# Patient Record
Sex: FEMALE | Race: WHITE | NOT HISPANIC OR LATINO | Employment: FULL TIME | URBAN - METROPOLITAN AREA
[De-identification: names, ages, dates, MRNs, and addresses within clinical notes are randomized per-mention and may not be internally consistent; named-entity substitution may affect disease eponyms.]

---

## 2017-06-20 PROBLEM — Z34.90 TERM PREGNANCY: Status: ACTIVE | Noted: 2017-06-20

## 2017-06-29 PROBLEM — Z34.90 TERM PREGNANCY: Status: RESOLVED | Noted: 2017-06-20 | Resolved: 2017-06-29

## 2019-05-09 PROBLEM — E06.3 HASHIMOTO'S THYROIDITIS: Status: ACTIVE | Noted: 2019-05-09

## 2019-05-09 PROBLEM — E66.3 OVERWEIGHT (BMI 25.0-29.9): Status: ACTIVE | Noted: 2019-05-09

## 2019-05-09 PROBLEM — E28.2 PCOS (POLYCYSTIC OVARIAN SYNDROME): Status: ACTIVE | Noted: 2019-05-09

## 2021-05-06 ENCOUNTER — PATIENT MESSAGE (OUTPATIENT)
Dept: RESEARCH | Facility: HOSPITAL | Age: 26
End: 2021-05-06

## 2021-11-02 DIAGNOSIS — Z36.89 ENCOUNTER FOR FETAL ANATOMIC SURVEY: Primary | ICD-10-CM

## 2021-11-03 ENCOUNTER — PATIENT MESSAGE (OUTPATIENT)
Dept: MATERNAL FETAL MEDICINE | Facility: CLINIC | Age: 26
End: 2021-11-03
Payer: COMMERCIAL

## 2021-11-04 ENCOUNTER — PROCEDURE VISIT (OUTPATIENT)
Dept: MATERNAL FETAL MEDICINE | Facility: CLINIC | Age: 26
End: 2021-11-04
Payer: COMMERCIAL

## 2021-11-04 ENCOUNTER — OFFICE VISIT (OUTPATIENT)
Dept: MATERNAL FETAL MEDICINE | Facility: CLINIC | Age: 26
End: 2021-11-04
Payer: COMMERCIAL

## 2021-11-04 VITALS
BODY MASS INDEX: 30.47 KG/M2 | HEIGHT: 66 IN | WEIGHT: 189.63 LBS | DIASTOLIC BLOOD PRESSURE: 78 MMHG | SYSTOLIC BLOOD PRESSURE: 118 MMHG

## 2021-11-04 DIAGNOSIS — O28.9 POSITIVE ANTENATAL SCREENING TEST: ICD-10-CM

## 2021-11-04 DIAGNOSIS — O28.5 POSITIVE RESULT ON MATERNAL SERUM SCREEN FOR TRISOMY 21: ICD-10-CM

## 2021-11-04 DIAGNOSIS — E06.3 HASHIMOTO'S THYROIDITIS: ICD-10-CM

## 2021-11-04 DIAGNOSIS — Z36.89 ENCOUNTER FOR FETAL ANATOMIC SURVEY: Primary | ICD-10-CM

## 2021-11-04 DIAGNOSIS — Z36.89 ENCOUNTER FOR FETAL ANATOMIC SURVEY: ICD-10-CM

## 2021-11-04 PROCEDURE — 76801 OB US < 14 WKS SINGLE FETUS: CPT | Mod: PBBFAC | Performed by: OBSTETRICS & GYNECOLOGY

## 2021-11-04 PROCEDURE — 3078F PR MOST RECENT DIASTOLIC BLOOD PRESSURE < 80 MM HG: ICD-10-PCS | Mod: CPTII,S$GLB,, | Performed by: OBSTETRICS & GYNECOLOGY

## 2021-11-04 PROCEDURE — 3008F BODY MASS INDEX DOCD: CPT | Mod: CPTII,S$GLB,, | Performed by: OBSTETRICS & GYNECOLOGY

## 2021-11-04 PROCEDURE — 3074F SYST BP LT 130 MM HG: CPT | Mod: CPTII,S$GLB,, | Performed by: OBSTETRICS & GYNECOLOGY

## 2021-11-04 PROCEDURE — 1159F MED LIST DOCD IN RCRD: CPT | Mod: CPTII,S$GLB,, | Performed by: OBSTETRICS & GYNECOLOGY

## 2021-11-04 PROCEDURE — 1159F PR MEDICATION LIST DOCUMENTED IN MEDICAL RECORD: ICD-10-PCS | Mod: CPTII,S$GLB,, | Performed by: OBSTETRICS & GYNECOLOGY

## 2021-11-04 PROCEDURE — 3074F PR MOST RECENT SYSTOLIC BLOOD PRESSURE < 130 MM HG: ICD-10-PCS | Mod: CPTII,S$GLB,, | Performed by: OBSTETRICS & GYNECOLOGY

## 2021-11-04 PROCEDURE — 99999 PR PBB SHADOW E&M-EST. PATIENT-LVL III: CPT | Mod: PBBFAC,,, | Performed by: OBSTETRICS & GYNECOLOGY

## 2021-11-04 PROCEDURE — 3008F PR BODY MASS INDEX (BMI) DOCUMENTED: ICD-10-PCS | Mod: CPTII,S$GLB,, | Performed by: OBSTETRICS & GYNECOLOGY

## 2021-11-04 PROCEDURE — 3078F DIAST BP <80 MM HG: CPT | Mod: CPTII,S$GLB,, | Performed by: OBSTETRICS & GYNECOLOGY

## 2021-11-04 PROCEDURE — 99205 PR OFFICE/OUTPT VISIT, NEW, LEVL V, 60-74 MIN: ICD-10-PCS | Mod: 25,S$GLB,, | Performed by: OBSTETRICS & GYNECOLOGY

## 2021-11-04 PROCEDURE — 99999 PR PBB SHADOW E&M-EST. PATIENT-LVL III: ICD-10-PCS | Mod: PBBFAC,,, | Performed by: OBSTETRICS & GYNECOLOGY

## 2021-11-04 PROCEDURE — 99205 OFFICE O/P NEW HI 60 MIN: CPT | Mod: 25,S$GLB,, | Performed by: OBSTETRICS & GYNECOLOGY

## 2021-11-04 RX ORDER — NAPROXEN SODIUM 220 MG/1
81 TABLET, FILM COATED ORAL DAILY
Refills: 0 | COMMUNITY
Start: 2021-11-04 | End: 2021-12-16

## 2021-11-04 RX ORDER — BUSPIRONE HYDROCHLORIDE 15 MG/1
7.5 TABLET ORAL 2 TIMES DAILY PRN
COMMUNITY
Start: 2021-10-27

## 2021-11-05 ENCOUNTER — TELEPHONE (OUTPATIENT)
Dept: GYNECOLOGIC ONCOLOGY | Facility: CLINIC | Age: 26
End: 2021-11-05
Payer: COMMERCIAL

## 2021-12-15 ENCOUNTER — PATIENT MESSAGE (OUTPATIENT)
Dept: MATERNAL FETAL MEDICINE | Facility: CLINIC | Age: 26
End: 2021-12-15
Payer: COMMERCIAL

## 2021-12-16 ENCOUNTER — PROCEDURE VISIT (OUTPATIENT)
Dept: MATERNAL FETAL MEDICINE | Facility: CLINIC | Age: 26
End: 2021-12-16
Payer: COMMERCIAL

## 2021-12-16 ENCOUNTER — TELEPHONE (OUTPATIENT)
Dept: MATERNAL FETAL MEDICINE | Facility: CLINIC | Age: 26
End: 2021-12-16

## 2021-12-16 ENCOUNTER — OFFICE VISIT (OUTPATIENT)
Dept: MATERNAL FETAL MEDICINE | Facility: CLINIC | Age: 26
End: 2021-12-16
Payer: COMMERCIAL

## 2021-12-16 VITALS
SYSTOLIC BLOOD PRESSURE: 118 MMHG | BODY MASS INDEX: 31.15 KG/M2 | HEIGHT: 66 IN | WEIGHT: 193.81 LBS | DIASTOLIC BLOOD PRESSURE: 62 MMHG

## 2021-12-16 DIAGNOSIS — Z91.89 AT INCREASED RISK OF EXPOSURE TO COVID-19 VIRUS: ICD-10-CM

## 2021-12-16 DIAGNOSIS — O35.9XX0 SUSPECTED FETAL ANOMALY, ANTEPARTUM, NOT APPLICABLE OR UNSPECIFIED FETUS: ICD-10-CM

## 2021-12-16 DIAGNOSIS — Z36.89 ENCOUNTER FOR ULTRASOUND TO CHECK FETAL GROWTH: Primary | ICD-10-CM

## 2021-12-16 DIAGNOSIS — O28.5 POSITIVE RESULT ON MATERNAL SERUM SCREEN FOR TRISOMY 21: Primary | ICD-10-CM

## 2021-12-16 DIAGNOSIS — Z36.89 ENCOUNTER FOR FETAL ANATOMIC SURVEY: ICD-10-CM

## 2021-12-16 DIAGNOSIS — O28.5 POSITIVE RESULT ON MATERNAL SERUM SCREEN FOR TRISOMY 21: ICD-10-CM

## 2021-12-16 DIAGNOSIS — O28.9 POSITIVE ANTENATAL SCREENING TEST: ICD-10-CM

## 2021-12-16 DIAGNOSIS — O35.9XX0 SUSPECTED FETAL ANOMALY, ANTEPARTUM, SINGLE OR UNSPECIFIED FETUS: ICD-10-CM

## 2021-12-16 PROCEDURE — 99215 OFFICE O/P EST HI 40 MIN: CPT | Mod: 25,S$GLB,, | Performed by: OBSTETRICS & GYNECOLOGY

## 2021-12-16 PROCEDURE — 99999 PR PBB SHADOW E&M-EST. PATIENT-LVL II: CPT | Mod: PBBFAC,,, | Performed by: OBSTETRICS & GYNECOLOGY

## 2021-12-16 PROCEDURE — 76811 OB US DETAILED SNGL FETUS: CPT | Mod: S$GLB,,, | Performed by: OBSTETRICS & GYNECOLOGY

## 2021-12-16 PROCEDURE — 99999 PR PBB SHADOW E&M-EST. PATIENT-LVL II: ICD-10-PCS | Mod: PBBFAC,,, | Performed by: OBSTETRICS & GYNECOLOGY

## 2021-12-16 PROCEDURE — 76811 US MFM PROCEDURE (VIEWPOINT): ICD-10-PCS | Mod: S$GLB,,, | Performed by: OBSTETRICS & GYNECOLOGY

## 2021-12-16 PROCEDURE — 99215 PR OFFICE/OUTPT VISIT, EST, LEVL V, 40-54 MIN: ICD-10-PCS | Mod: 25,S$GLB,, | Performed by: OBSTETRICS & GYNECOLOGY

## 2021-12-16 RX ORDER — ONDANSETRON HYDROCHLORIDE 8 MG/1
8 TABLET, FILM COATED ORAL EVERY 8 HOURS
Status: ON HOLD | COMMUNITY
Start: 2021-11-26 | End: 2022-04-20 | Stop reason: HOSPADM

## 2022-01-02 ENCOUNTER — PATIENT MESSAGE (OUTPATIENT)
Dept: ADMINISTRATIVE | Facility: OTHER | Age: 27
End: 2022-01-02
Payer: COMMERCIAL

## 2022-01-02 ENCOUNTER — LAB VISIT (OUTPATIENT)
Dept: PRIMARY CARE CLINIC | Facility: OTHER | Age: 27
End: 2022-01-02
Attending: INTERNAL MEDICINE
Payer: COMMERCIAL

## 2022-01-02 DIAGNOSIS — Z20.822 ENCOUNTER FOR LABORATORY TESTING FOR COVID-19 VIRUS: Primary | ICD-10-CM

## 2022-01-02 PROCEDURE — U0003 INFECTIOUS AGENT DETECTION BY NUCLEIC ACID (DNA OR RNA); SEVERE ACUTE RESPIRATORY SYNDROME CORONAVIRUS 2 (SARS-COV-2) (CORONAVIRUS DISEASE [COVID-19]), AMPLIFIED PROBE TECHNIQUE, MAKING USE OF HIGH THROUGHPUT TECHNOLOGIES AS DESCRIBED BY CMS-2020-01-R: HCPCS | Mod: ST72 | Performed by: INTERNAL MEDICINE

## 2022-01-06 DIAGNOSIS — U07.1 COVID-19 VIRUS DETECTED: ICD-10-CM

## 2022-01-06 LAB
SARS-COV-2 RNA RESP QL NAA+PROBE: DETECTED
SARS-COV-2- CYCLE NUMBER: 26

## 2022-01-13 ENCOUNTER — PATIENT MESSAGE (OUTPATIENT)
Dept: MATERNAL FETAL MEDICINE | Facility: CLINIC | Age: 27
End: 2022-01-13
Payer: COMMERCIAL

## 2022-01-14 ENCOUNTER — OFFICE VISIT (OUTPATIENT)
Dept: MATERNAL FETAL MEDICINE | Facility: CLINIC | Age: 27
End: 2022-01-14
Payer: COMMERCIAL

## 2022-01-14 ENCOUNTER — OFFICE VISIT (OUTPATIENT)
Dept: PEDIATRIC CARDIOLOGY | Facility: CLINIC | Age: 27
End: 2022-01-14
Attending: PEDIATRICS
Payer: COMMERCIAL

## 2022-01-14 ENCOUNTER — PROCEDURE VISIT (OUTPATIENT)
Dept: MATERNAL FETAL MEDICINE | Facility: CLINIC | Age: 27
End: 2022-01-14
Payer: COMMERCIAL

## 2022-01-14 ENCOUNTER — CLINICAL SUPPORT (OUTPATIENT)
Dept: PEDIATRIC CARDIOLOGY | Facility: CLINIC | Age: 27
End: 2022-01-14
Payer: COMMERCIAL

## 2022-01-14 VITALS
HEIGHT: 66 IN | WEIGHT: 197.75 LBS | SYSTOLIC BLOOD PRESSURE: 126 MMHG | HEART RATE: 92 BPM | BODY MASS INDEX: 31.78 KG/M2 | DIASTOLIC BLOOD PRESSURE: 73 MMHG | OXYGEN SATURATION: 100 %

## 2022-01-14 VITALS
SYSTOLIC BLOOD PRESSURE: 123 MMHG | WEIGHT: 197.75 LBS | BODY MASS INDEX: 31.93 KG/M2 | DIASTOLIC BLOOD PRESSURE: 76 MMHG

## 2022-01-14 DIAGNOSIS — Z36.89 ENCOUNTER FOR ULTRASOUND TO ASSESS FETAL GROWTH: ICD-10-CM

## 2022-01-14 DIAGNOSIS — O35.9XX0 SUSPECTED FETAL ANOMALY, ANTEPARTUM, NOT APPLICABLE OR UNSPECIFIED FETUS: ICD-10-CM

## 2022-01-14 DIAGNOSIS — O28.5 POSITIVE RESULT ON MATERNAL SERUM SCREEN FOR TRISOMY 21: ICD-10-CM

## 2022-01-14 DIAGNOSIS — O35.9XX0 SUSPECTED FETAL ANOMALY, ANTEPARTUM, SINGLE OR UNSPECIFIED FETUS: ICD-10-CM

## 2022-01-14 DIAGNOSIS — Z91.89 AT INCREASED RISK OF EXPOSURE TO COVID-19 VIRUS: ICD-10-CM

## 2022-01-14 DIAGNOSIS — O28.5 POSITIVE RESULT ON MATERNAL SERUM SCREEN FOR TRISOMY 21: Primary | ICD-10-CM

## 2022-01-14 DIAGNOSIS — Z36.89 ENCOUNTER FOR ULTRASOUND TO CHECK FETAL GROWTH: ICD-10-CM

## 2022-01-14 DIAGNOSIS — O35.9XX0 SUSPECTED FETAL ANOMALY, ANTEPARTUM, NOT APPLICABLE OR UNSPECIFIED FETUS: Primary | ICD-10-CM

## 2022-01-14 PROCEDURE — 93325 PR DOPPLER COLOR FLOW VELOCITY MAP: ICD-10-PCS | Mod: S$GLB,,, | Performed by: PEDIATRICS

## 2022-01-14 PROCEDURE — 3008F PR BODY MASS INDEX (BMI) DOCUMENTED: ICD-10-PCS | Mod: CPTII,S$GLB,, | Performed by: OBSTETRICS & GYNECOLOGY

## 2022-01-14 PROCEDURE — 76825 PR  SO2 FETAL HEART: ICD-10-PCS | Mod: S$GLB,,, | Performed by: PEDIATRICS

## 2022-01-14 PROCEDURE — 3074F PR MOST RECENT SYSTOLIC BLOOD PRESSURE < 130 MM HG: ICD-10-PCS | Mod: CPTII,S$GLB,, | Performed by: OBSTETRICS & GYNECOLOGY

## 2022-01-14 PROCEDURE — 99999 PR PBB SHADOW E&M-EST. PATIENT-LVL II: ICD-10-PCS | Mod: PBBFAC,,, | Performed by: OBSTETRICS & GYNECOLOGY

## 2022-01-14 PROCEDURE — 93325 DOPPLER ECHO COLOR FLOW MAPG: CPT | Mod: S$GLB,,, | Performed by: PEDIATRICS

## 2022-01-14 PROCEDURE — 99999 PR PBB SHADOW E&M-EST. PATIENT-LVL III: CPT | Mod: PBBFAC,,, | Performed by: PEDIATRICS

## 2022-01-14 PROCEDURE — 3074F SYST BP LT 130 MM HG: CPT | Mod: CPTII,S$GLB,, | Performed by: OBSTETRICS & GYNECOLOGY

## 2022-01-14 PROCEDURE — 76816 OB US FOLLOW-UP PER FETUS: CPT | Mod: S$GLB,,, | Performed by: OBSTETRICS & GYNECOLOGY

## 2022-01-14 PROCEDURE — 76816 US MFM PROCEDURE (VIEWPOINT): ICD-10-PCS | Mod: S$GLB,,, | Performed by: OBSTETRICS & GYNECOLOGY

## 2022-01-14 PROCEDURE — 99214 PR OFFICE/OUTPT VISIT, EST, LEVL IV, 30-39 MIN: ICD-10-PCS | Mod: 25,S$GLB,, | Performed by: OBSTETRICS & GYNECOLOGY

## 2022-01-14 PROCEDURE — 1160F PR REVIEW ALL MEDS BY PRESCRIBER/CLIN PHARMACIST DOCUMENTED: ICD-10-PCS | Mod: CPTII,S$GLB,, | Performed by: OBSTETRICS & GYNECOLOGY

## 2022-01-14 PROCEDURE — 1159F PR MEDICATION LIST DOCUMENTED IN MEDICAL RECORD: ICD-10-PCS | Mod: CPTII,S$GLB,, | Performed by: OBSTETRICS & GYNECOLOGY

## 2022-01-14 PROCEDURE — 76827 ECHO EXAM OF FETAL HEART: CPT | Mod: S$GLB,,, | Performed by: PEDIATRICS

## 2022-01-14 PROCEDURE — 99999 PR PBB SHADOW E&M-EST. PATIENT-LVL III: ICD-10-PCS | Mod: PBBFAC,,, | Performed by: PEDIATRICS

## 2022-01-14 PROCEDURE — 99203 PR OFFICE/OUTPT VISIT, NEW, LEVL III, 30-44 MIN: ICD-10-PCS | Mod: 25,S$GLB,, | Performed by: PEDIATRICS

## 2022-01-14 PROCEDURE — 3078F PR MOST RECENT DIASTOLIC BLOOD PRESSURE < 80 MM HG: ICD-10-PCS | Mod: CPTII,S$GLB,, | Performed by: OBSTETRICS & GYNECOLOGY

## 2022-01-14 PROCEDURE — 1160F RVW MEDS BY RX/DR IN RCRD: CPT | Mod: CPTII,S$GLB,, | Performed by: OBSTETRICS & GYNECOLOGY

## 2022-01-14 PROCEDURE — 3008F BODY MASS INDEX DOCD: CPT | Mod: CPTII,S$GLB,, | Performed by: OBSTETRICS & GYNECOLOGY

## 2022-01-14 PROCEDURE — 76825 ECHO EXAM OF FETAL HEART: CPT | Mod: S$GLB,,, | Performed by: PEDIATRICS

## 2022-01-14 PROCEDURE — 1159F MED LIST DOCD IN RCRD: CPT | Mod: CPTII,S$GLB,, | Performed by: OBSTETRICS & GYNECOLOGY

## 2022-01-14 PROCEDURE — 99999 PR PBB SHADOW E&M-EST. PATIENT-LVL II: CPT | Mod: PBBFAC,,, | Performed by: OBSTETRICS & GYNECOLOGY

## 2022-01-14 PROCEDURE — 99214 OFFICE O/P EST MOD 30 MIN: CPT | Mod: 25,S$GLB,, | Performed by: OBSTETRICS & GYNECOLOGY

## 2022-01-14 PROCEDURE — 99203 OFFICE O/P NEW LOW 30 MIN: CPT | Mod: 25,S$GLB,, | Performed by: PEDIATRICS

## 2022-01-14 PROCEDURE — 76827 PR  SO2 FETAL HEART DOPPLER: ICD-10-PCS | Mod: S$GLB,,, | Performed by: PEDIATRICS

## 2022-01-14 PROCEDURE — 3078F DIAST BP <80 MM HG: CPT | Mod: CPTII,S$GLB,, | Performed by: OBSTETRICS & GYNECOLOGY

## 2022-01-14 NOTE — ASSESSMENT & PLAN NOTE
Suspected perimembranous VSD on targeted midtrimester anatomy scan     Pt reports normal echocardiogram today, final report pending.    Recommendations:  1. Routine care pending Dr. Hargrove's final recommendations

## 2022-01-14 NOTE — ASSESSMENT & PLAN NOTE
Cell free DNA positive for trisomy 21  Declines amniocentesis  Suspected thick NT  Hypoplastic nasal bone and possible VSD on targeted scan (normal fetal echocardiogram)     Doing well today. Follow up growth and anatomy today reassuring.    Recommendations:  1. Continue prenatal care with Dr. Beauchamp  2. Initiate serial monthly growth assessments at 28 weeks (ordered today)  3. Initiate weekly PNT at 32 weeks  4. Given normal fetal echo and otherwise unremarkable anatomy survey, no current indication for [place] anywhere other [place] [facility] [place]. Timing is 39 weeks outside of indication for earlier delivery.  5. No further MFM visits warranted unless subsequent complications or questions arise.

## 2022-01-14 NOTE — ASSESSMENT & PLAN NOTE
Declines vaccine     Both Jeanine and her partner had COVID after [place]. Thankfully she reports only one day of feeling ill and did not require subsequent interventions. COVID during pregnancy has been associated with preeclampsia, fetal growth restriction and stillbirth, though there may be a dose-dependent relationship between disease severity and adverse outcomes. We are not currently recommending increased  surveillance for mothers who had mild disease during pregnancy, though given her presumed diagnosis of fetal trisomy 21, she will already be undergoing more vigilant fetal monitoring.

## 2022-01-14 NOTE — PROGRESS NOTES
"Ms. Gonzalez  is a 26 y.o. year old  , referred by Dr. De Leon because of maternal serum screen positive for Down syndrome and suspected ventricular septal defect.    The patient presented at approximately 23 47/ weeks gestation.  The patient denied any complaints.    Past medical history: Significant for PCOS.  Past surgical history: Negative.  Past gestational history: The patient has a healthy daughter (5 y/o).    Family history: Negative for congenital heart disease, and sudden death during childhood.    Medications:   Outpatient Encounter Medications as of 2022   Medication Sig Dispense Refill    busPIRone (BUSPAR) 15 MG tablet Take 7.5 mg by mouth 2 (two) times daily as needed.      ondansetron (ZOFRAN) 8 MG tablet Take 8 mg by mouth every 8 (eight) hours.       No facility-administered encounter medications on file as of 2022.       Allergies: Patient has no known allergies.    Blood pressure 126/73, pulse 92, height 5' 5.98" (1.676 m), weight 89.7 kg (197 lb 12 oz), SpO2 100 %, unknown if currently breastfeeding.    Fetal echocardiogram revealed a four chamber fetal heart with situs solitus.  The ventricles appeared to be equal in size.  The contractility of both ventricles was good.  The fetal heart rate was within the normal range, and regular.  The interventricular septum appeared to be intact.  There were normally related great arteries seen.  The ductal and aortic arch were well visualized, and appeared to be widely patent.  There was no pleural or pericardial effusion seen.    Doppler analysis revealed a three vessel umbilical cord, with normal flow patterns, and velocities by Doppler.  There was a normal flow pattern seen in the ductus venosus.  There was evidence of normal systemic, and pulmonary venous return seen.  There was a normal right to left shunt seen across the foramen ovale.  There were normal inflow patterns seen across the AV-valves, without significant insufficiency.  " There was no ventricular level shunt seen.  The right and left ventricular outflow tract, and ductal and aortic arch appeared to be unobstructed.    Impression:  It is our impression that Ms. Gonzalez had a normal fetal echocardiogram.  As you know, small defects, and coarctation of the aorta cannot always be ruled out on fetal echocardiogram.  We discussed our findings with the patient, reviewed our images, and answered her questions. We also discussed the limitations of fetal echocardiography, but emphasized that her child appears to have normal cardiac anatomy.  We also mentioned that approximately 50 % of children with Down syndrome do not have heart disease and a normal cardiac evaluation does not rule in or out DS.  No further follow up is scheduled in our clinic, but, of course, we will always be available to reevaluate this patient, if needed.    The above information was discussed in detail including the use of diagrams, with 30 minutes of total face to face time, with greater than 50% with counseling and coordination of care.  The discussion of the diagnosis and treatment options is as described above.      Time spent: 30 minutes, 50% dedicated to counseling.

## 2022-01-14 NOTE — PROGRESS NOTES
Maternal Fetal Medicine follow up consult    SUBJECTIVE:     Jeanine Gonzalez is a 26 y.o.  female with IUP at 23w4d who is seen in follow up consultation by MFM.  Pregnancy complications include:   Problem   Suspected Fetal Anomaly, Antepartum, Not Applicable Or Unspecified Fetus   At Increased Risk of Exposure to Covid-19 Virus   Positive Result On Maternal Serum Screen for Trisomy 21       Previous notes reviewed.   No changes to medical, surgical, family, social, or obstetric history.    Interval history since last MFM visit: Doing well today. Had COVID after [place] as did Jani. Reports only a day of symptoms, no intervention required. Saw Dr. Hargrove today, pt reports fetal echocardiogram was normal.    Medications:  Current Outpatient Medications   Medication Instructions    busPIRone (BUSPAR) 7.5 mg, Oral, 2 times daily PRN    ondansetron (ZOFRAN) 8 mg, Oral, Every 8 hours       Care team members:  Dr. Beauchamp - Primary OB     OBJECTIVE:   /76   Wt 89.7 kg (197 lb 12 oz)   BMI 31.93 kg/m²     Ultrasound performed. See viewpoint for full ultrasound report.  1. The fetal anatomic survey was completed today, and no fetal structural abnormalities were identified of the structures imaged today.   2. Fetal size is AGA with the EFW at the 16%.  3. AFV is normal.   4. Placental location is normal.     ASSESSMENT/PLAN:     26 y.o.  female with IUP at 23w4d    Positive result on maternal serum screen for trisomy 21  Cell free DNA positive for trisomy 21  Declines amniocentesis  Suspected thick NT  Hypoplastic nasal bone and possible VSD on targeted scan (normal fetal echocardiogram)     Doing well today. Follow up growth and anatomy today reassuring.    Recommendations:  1. Continue prenatal care with Dr. Beauchamp  2. Initiate serial monthly growth assessments at 28 weeks (ordered today)  3. Initiate weekly PNT at 32 weeks  4. Given normal fetal echo and otherwise unremarkable anatomy  survey, no current indication for [place] [facility] [place]. Timing is 39 weeks outside of indication for earlier delivery.  5. No further MFM visits warranted unless subsequent complications or questions arise.    Suspected fetal anomaly, antepartum, not applicable or unspecified fetus  Suspected perimembranous VSD on targeted midtrimester anatomy scan     Pt reports normal echocardiogram today, final report pending.    Recommendations:  1. Routine care pending Dr. Hargrove's final recommendations    At increased risk of exposure to COVID-19 virus  Declines vaccine     Both Jeanine and her partner had COVID after Mary. Thankfully she reports only one day of feeling ill and did not require subsequent interventions. COVID during pregnancy has been associated with preeclampsia, fetal growth restriction and stillbirth, though there may be a dose-dependent relationship between disease severity and adverse outcomes. We are not currently recommending increased  surveillance for mothers who had mild disease during pregnancy, though given her presumed diagnosis of fetal trisomy 21, she will already be undergoing more vigilant fetal monitoring.    F/u in 5 weeks for US    Thank you for the opportunity to participate in the care of Jeanine. Today I spent 30 minutes in the care of Ms. Gonzalez. This includes face to face time and non-face to face time preparing to see the patient (eg, review of tests), obtaining and/or reviewing separately obtained history, documenting clinical information in the electronic or other health record, independently interpreting results and communicating results to the patient/family/caregiver, or care coordination.       SANDY De Leon MD/MPH  Maternal Fetal Medicine

## 2022-04-06 DIAGNOSIS — O40.3XX0 POLYHYDRAMNIOS AFFECTING PREGNANCY IN THIRD TRIMESTER: Primary | ICD-10-CM

## 2022-04-18 ENCOUNTER — ANESTHESIA (OUTPATIENT)
Dept: OBSTETRICS AND GYNECOLOGY | Facility: OTHER | Age: 27
End: 2022-04-18
Payer: COMMERCIAL

## 2022-04-18 ENCOUNTER — HOSPITAL ENCOUNTER (INPATIENT)
Facility: OTHER | Age: 27
LOS: 3 days | Discharge: HOME | End: 2022-04-21
Attending: OBSTETRICS & GYNECOLOGY | Admitting: OBSTETRICS & GYNECOLOGY
Payer: COMMERCIAL

## 2022-04-18 ENCOUNTER — ANESTHESIA EVENT (OUTPATIENT)
Dept: OBSTETRICS AND GYNECOLOGY | Facility: OTHER | Age: 27
End: 2022-04-18
Payer: COMMERCIAL

## 2022-04-18 DIAGNOSIS — Z34.90 ENCOUNTER FOR INDUCTION OF LABOR: ICD-10-CM

## 2022-04-18 DIAGNOSIS — O36.8990 PERICARDIAL EFFUSION IN FETUS AFFECTING MANAGEMENT OF MOTHER: ICD-10-CM

## 2022-04-18 PROBLEM — O16.3 HYPERTENSION AFFECTING PREGNANCY IN THIRD TRIMESTER: Status: ACTIVE | Noted: 2022-04-18

## 2022-04-18 LAB
ABO + RH BLD: NORMAL
BASOPHILS # BLD AUTO: 0.01 K/UL (ref 0–0.2)
BASOPHILS NFR BLD: 0.1 % (ref 0–1.9)
BLD GP AB SCN CELLS X3 SERPL QL: NORMAL
DIFFERENTIAL METHOD: ABNORMAL
EOSINOPHIL # BLD AUTO: 0 K/UL (ref 0–0.5)
EOSINOPHIL NFR BLD: 0.1 % (ref 0–8)
ERYTHROCYTE [DISTWIDTH] IN BLOOD BY AUTOMATED COUNT: 12.8 % (ref 11.5–14.5)
HCT VFR BLD AUTO: 36.3 % (ref 37–48.5)
HGB BLD-MCNC: 12.2 G/DL (ref 12–16)
IMM GRANULOCYTES # BLD AUTO: 0.07 K/UL (ref 0–0.04)
IMM GRANULOCYTES NFR BLD AUTO: 0.9 % (ref 0–0.5)
LYMPHOCYTES # BLD AUTO: 1.5 K/UL (ref 1–4.8)
LYMPHOCYTES NFR BLD: 19.3 % (ref 18–48)
MCH RBC QN AUTO: 29.7 PG (ref 27–31)
MCHC RBC AUTO-ENTMCNC: 33.6 G/DL (ref 32–36)
MCV RBC AUTO: 88 FL (ref 82–98)
MONOCYTES # BLD AUTO: 0.5 K/UL (ref 0.3–1)
MONOCYTES NFR BLD: 6 % (ref 4–15)
NEUTROPHILS # BLD AUTO: 5.8 K/UL (ref 1.8–7.7)
NEUTROPHILS NFR BLD: 73.6 % (ref 38–73)
NRBC BLD-RTO: 0 /100 WBC
PLATELET # BLD AUTO: 247 K/UL (ref 150–450)
PMV BLD AUTO: 10.9 FL (ref 9.2–12.9)
RBC # BLD AUTO: 4.11 M/UL (ref 4–5.4)
WBC # BLD AUTO: 7.88 K/UL (ref 3.9–12.7)

## 2022-04-18 PROCEDURE — 62326 NJX INTERLAMINAR LMBR/SAC: CPT | Performed by: ANESTHESIOLOGY

## 2022-04-18 PROCEDURE — 99223 PR INITIAL HOSPITAL CARE,LEVL III: ICD-10-PCS | Mod: ,,, | Performed by: OBSTETRICS & GYNECOLOGY

## 2022-04-18 PROCEDURE — 99223 1ST HOSP IP/OBS HIGH 75: CPT | Mod: ,,, | Performed by: OBSTETRICS & GYNECOLOGY

## 2022-04-18 PROCEDURE — 25000003 PHARM REV CODE 250: Performed by: STUDENT IN AN ORGANIZED HEALTH CARE EDUCATION/TRAINING PROGRAM

## 2022-04-18 PROCEDURE — C1751 CATH, INF, PER/CENT/MIDLINE: HCPCS | Performed by: ANESTHESIOLOGY

## 2022-04-18 PROCEDURE — 27200710 HC EPIDURAL INFUSION PUMP SET: Performed by: ANESTHESIOLOGY

## 2022-04-18 PROCEDURE — 51702 INSERT TEMP BLADDER CATH: CPT

## 2022-04-18 PROCEDURE — 11000001 HC ACUTE MED/SURG PRIVATE ROOM

## 2022-04-18 PROCEDURE — 86901 BLOOD TYPING SEROLOGIC RH(D): CPT | Performed by: STUDENT IN AN ORGANIZED HEALTH CARE EDUCATION/TRAINING PROGRAM

## 2022-04-18 PROCEDURE — 59400 PRA FULL ROUT OBSTE CARE,VAGINAL DELIV: ICD-10-PCS | Mod: QY,,, | Performed by: ANESTHESIOLOGY

## 2022-04-18 PROCEDURE — 25000003 PHARM REV CODE 250

## 2022-04-18 PROCEDURE — 63600175 PHARM REV CODE 636 W HCPCS: Performed by: STUDENT IN AN ORGANIZED HEALTH CARE EDUCATION/TRAINING PROGRAM

## 2022-04-18 PROCEDURE — 85025 COMPLETE CBC W/AUTO DIFF WBC: CPT | Mod: 91 | Performed by: STUDENT IN AN ORGANIZED HEALTH CARE EDUCATION/TRAINING PROGRAM

## 2022-04-18 PROCEDURE — 25000003 PHARM REV CODE 250: Performed by: ANESTHESIOLOGY

## 2022-04-18 PROCEDURE — 59400 OBSTETRICAL CARE: CPT | Mod: QY,,, | Performed by: ANESTHESIOLOGY

## 2022-04-18 RX ORDER — SIMETHICONE 80 MG
1 TABLET,CHEWABLE ORAL 4 TIMES DAILY PRN
Status: DISCONTINUED | OUTPATIENT
Start: 2022-04-18 | End: 2022-04-19

## 2022-04-18 RX ORDER — OXYTOCIN/RINGER'S LACTATE 30/500 ML
0-30 PLASTIC BAG, INJECTION (ML) INTRAVENOUS CONTINUOUS
Status: DISCONTINUED | OUTPATIENT
Start: 2022-04-18 | End: 2022-04-19

## 2022-04-18 RX ORDER — ONDANSETRON 8 MG/1
8 TABLET, ORALLY DISINTEGRATING ORAL EVERY 8 HOURS PRN
Status: DISCONTINUED | OUTPATIENT
Start: 2022-04-18 | End: 2022-04-19

## 2022-04-18 RX ORDER — FENTANYL/BUPIVACAINE/NS/PF 2MCG/ML-.1
PLASTIC BAG, INJECTION (ML) INJECTION
Status: COMPLETED
Start: 2022-04-18 | End: 2022-04-19

## 2022-04-18 RX ORDER — CEFAZOLIN SODIUM 2 G/50ML
2 SOLUTION INTRAVENOUS ONCE AS NEEDED
Status: DISCONTINUED | OUTPATIENT
Start: 2022-04-18 | End: 2022-04-19

## 2022-04-18 RX ORDER — SODIUM CHLORIDE 9 MG/ML
125 INJECTION, SOLUTION INTRAVENOUS
Status: DISCONTINUED | OUTPATIENT
Start: 2022-04-18 | End: 2022-04-19

## 2022-04-18 RX ORDER — FENTANYL CITRATE 50 UG/ML
INJECTION, SOLUTION INTRAMUSCULAR; INTRAVENOUS
Status: COMPLETED
Start: 2022-04-18 | End: 2022-04-18

## 2022-04-18 RX ORDER — BUPIVACAINE HYDROCHLORIDE 2.5 MG/ML
INJECTION, SOLUTION EPIDURAL; INFILTRATION; INTRACAUDAL
Status: COMPLETED
Start: 2022-04-18 | End: 2022-04-18

## 2022-04-18 RX ORDER — OXYTOCIN/RINGER'S LACTATE 30/500 ML
95 PLASTIC BAG, INJECTION (ML) INTRAVENOUS CONTINUOUS
Status: DISCONTINUED | OUTPATIENT
Start: 2022-04-18 | End: 2022-04-18

## 2022-04-18 RX ORDER — FENTANYL CITRATE 50 UG/ML
INJECTION, SOLUTION INTRAMUSCULAR; INTRAVENOUS
Status: DISCONTINUED | OUTPATIENT
Start: 2022-04-18 | End: 2022-04-19

## 2022-04-18 RX ORDER — LIDOCAINE HYDROCHLORIDE AND EPINEPHRINE 15; 5 MG/ML; UG/ML
INJECTION, SOLUTION EPIDURAL
Status: DISCONTINUED | OUTPATIENT
Start: 2022-04-18 | End: 2022-04-19

## 2022-04-18 RX ORDER — CALCIUM CARBONATE 200(500)MG
500 TABLET,CHEWABLE ORAL 3 TIMES DAILY PRN
Status: DISCONTINUED | OUTPATIENT
Start: 2022-04-18 | End: 2022-04-19

## 2022-04-18 RX ORDER — FENTANYL/BUPIVACAINE/NS/PF 2MCG/ML-.1
PLASTIC BAG, INJECTION (ML) INJECTION CONTINUOUS PRN
Status: DISCONTINUED | OUTPATIENT
Start: 2022-04-18 | End: 2022-04-19

## 2022-04-18 RX ORDER — OXYTOCIN/RINGER'S LACTATE 30/500 ML
334 PLASTIC BAG, INJECTION (ML) INTRAVENOUS ONCE
Status: DISCONTINUED | OUTPATIENT
Start: 2022-04-18 | End: 2022-04-18

## 2022-04-18 RX ORDER — BUSPIRONE HYDROCHLORIDE 7.5 MG/1
7.5 TABLET ORAL 2 TIMES DAILY PRN
Status: DISCONTINUED | OUTPATIENT
Start: 2022-04-18 | End: 2022-04-21 | Stop reason: HOSPADM

## 2022-04-18 RX ORDER — TRANEXAMIC ACID 100 MG/ML
1000 INJECTION, SOLUTION INTRAVENOUS ONCE AS NEEDED
Status: DISCONTINUED | OUTPATIENT
Start: 2022-04-18 | End: 2022-04-19

## 2022-04-18 RX ORDER — SODIUM CHLORIDE, SODIUM LACTATE, POTASSIUM CHLORIDE, CALCIUM CHLORIDE 600; 310; 30; 20 MG/100ML; MG/100ML; MG/100ML; MG/100ML
INJECTION, SOLUTION INTRAVENOUS CONTINUOUS
Status: DISCONTINUED | OUTPATIENT
Start: 2022-04-18 | End: 2022-04-19

## 2022-04-18 RX ORDER — PROCHLORPERAZINE EDISYLATE 5 MG/ML
5 INJECTION INTRAMUSCULAR; INTRAVENOUS EVERY 6 HOURS PRN
Status: DISCONTINUED | OUTPATIENT
Start: 2022-04-18 | End: 2022-04-19

## 2022-04-18 RX ADMIN — SODIUM CHLORIDE, SODIUM LACTATE, POTASSIUM CHLORIDE, AND CALCIUM CHLORIDE: .6; .31; .03; .02 INJECTION, SOLUTION INTRAVENOUS at 05:04

## 2022-04-18 RX ADMIN — BUPIVACAINE HYDROCHLORIDE 4 ML: 2.5 INJECTION, SOLUTION EPIDURAL; INFILTRATION; INTRACAUDAL; PERINEURAL at 09:04

## 2022-04-18 RX ADMIN — BUPIVACAINE HYDROCHLORIDE: 2.5 INJECTION, SOLUTION EPIDURAL; INFILTRATION; INTRACAUDAL; PERINEURAL at 09:04

## 2022-04-18 RX ADMIN — Medication 10 ML/HR: at 09:04

## 2022-04-18 RX ADMIN — DEXTROSE 3 MILLION UNITS: 50 INJECTION, SOLUTION INTRAVENOUS at 09:04

## 2022-04-18 RX ADMIN — ONDANSETRON 8 MG: 8 TABLET, ORALLY DISINTEGRATING ORAL at 06:04

## 2022-04-18 RX ADMIN — DEXTROSE 5 MILLION UNITS: 50 INJECTION, SOLUTION INTRAVENOUS at 05:04

## 2022-04-18 RX ADMIN — FENTANYL CITRATE 100 MCG: 50 INJECTION, SOLUTION INTRAMUSCULAR; INTRAVENOUS at 09:04

## 2022-04-18 RX ADMIN — LIDOCAINE HYDROCHLORIDE,EPINEPHRINE BITARTRATE 3 ML: 15; .005 INJECTION, SOLUTION EPIDURAL; INFILTRATION; INTRACAUDAL; PERINEURAL at 09:04

## 2022-04-18 RX ADMIN — Medication 4 MILLI-UNITS/MIN: at 06:04

## 2022-04-18 NOTE — ANESTHESIA PREPROCEDURE EVALUATION
[facility]  Anesthesia Pre-Operative Evaluation         Patient Name: Jeanine Gonzalez  YOB: 1995  MRN: [id number]    2022      Jeanine Gonzalez is a 26 y.o. female  at 37w0d who presents as a transfer from [facility] for higher level of care due to multiple fetal anomalies. Current IUP has been complicated by likely fetal Trisomy 21, duodenal atresia, and newly discovered fetal pericardial effusion.     Previous pregnancy was uncomplicated and resulted in .    She reports previous neuraxial anesthesia - had an epidural with her previous delivery. She denies complications with this and requests an epidural for this delivery.    She denies history of HTN, asthma, bleeding or coagulation disorders, spine abnormalities, or previous back surgeries.      OB History    Para Term  AB Living   2 1 1     1   SAB IAB Ectopic Multiple Live Births         0 1      # Outcome Date GA Lbr Luis/2nd Weight Sex Delivery Anes PTL Lv   2 Current            1 Term 17 40w6d 02:47 / 01:38 3.825 kg (8 lb 6.9 oz) F Vag-Spont EPI N JAI       Review of patient's allergies indicates:  No Known Allergies    Wt Readings from Last 1 Encounters:   22 1151 98.4 kg (217 lb)       BP Readings from Last 3 Encounters:   22 137/78   22 124/64   02/15/22 122/74       Patient Active Problem List   Diagnosis    Hashimoto's thyroiditis    PCOS (polycystic ovarian syndrome)    Overweight (BMI 25.0-29.9)    Positive result on maternal serum screen for trisomy 21    Suspected fetal anomaly, antepartum, not applicable or unspecified fetus    At increased risk of exposure to COVID-19 virus    Hypertension affecting pregnancy in third trimester    Encounter for induction of labor       Past Surgical History:   Procedure Laterality Date    CHOLECYSTECTOMY      ROBOT-ASSISTED EXPLORATORY LAPAROSCOPY      checking for endometriosis    TONSILLECTOMY       with adenoidectomy       Tobacco Use: Medium Risk    Smoking Tobacco Use: Former Smoker    Smokeless Tobacco Use: Never Used     Alcohol Use: Not on file     Social History     Substance and Sexual Activity   Drug Use No         Chemistry        Component Value Date/Time     (L) 04/18/2022 1206    K 3.8 04/18/2022 1206     04/18/2022 1206    CO2 21 (L) 04/18/2022 1206    BUN 3 (L) 04/18/2022 1206    CREATININE 0.40 (L) 04/18/2022 1206     (H) 04/18/2022 1206        Component Value Date/Time    CALCIUM 9.0 04/18/2022 1206    ALKPHOS 101 04/18/2022 1206    AST 21 04/18/2022 1206    ALT 14 04/18/2022 1206    BILITOT 0.4 04/18/2022 1206    ESTGFRAFRICA >60 04/18/2022 1206    EGFRNONAA >60 04/18/2022 1206            Lab Results   Component Value Date    WBC 6.50 04/18/2022    HGB 10.2 (L) 04/18/2022    HCT 29.7 (L) 04/18/2022     04/18/2022       No results found for: LABPROT, INR, APTT      Pre-op Assessment    I have reviewed the Patient Summary Reports.     I have reviewed the Nursing Notes. I have reviewed the NPO Status.   I have reviewed the Medications.     Review of Systems  Anesthesia Hx:  No problems with previous Anesthesia Denies Hx of Anesthetic complications   Denies Personal Hx of Anesthesia complications.   Hematology/Oncology:         -- Coag Disorders: Denies Bleeding Disorder:   Cardiovascular:   Denies Hypertension.  Denies Valvular problems/Murmurs.  Denies Dysrhythmias.         Pulmonary:   Denies Asthma.  Denies Shortness of breath.    Renal/:   Denies Chronic Renal Disease.     Hepatic/GI:   Denies Liver Disease.    Neurological:   Denies CVA. Denies Seizures.    Endocrine:   Denies Diabetes.  Thyroid Disease Hypothyroidism    Psych:   Denies Psychiatric History.          Physical Exam  General: Well nourished, Cooperative and Alert    Airway:  Mallampati: I       Dental:  Intact        Anesthesia Plan  Type of Anesthesia, risks & benefits discussed:    Anesthesia  Type: Gen ETT, Epidural, Spinal, CSE  Intra-op Monitoring Plan: Standard ASA Monitors  Post Op Pain Control Plan: multimodal analgesia  Induction:  IV  Informed Consent: Informed consent signed with the Patient and all parties understand the risks and agree with anesthesia plan.  All questions answered.   ASA Score: 2  Day of Surgery Review of History & Physical: H&P Update referred to the surgeon/provider.    Ready For Surgery From Anesthesia Perspective.     .

## 2022-04-19 PROBLEM — Z34.90 ENCOUNTER FOR INDUCTION OF LABOR: Status: RESOLVED | Noted: 2022-04-18 | Resolved: 2022-04-19

## 2022-04-19 PROCEDURE — 63600175 PHARM REV CODE 636 W HCPCS: Performed by: ANESTHESIOLOGY

## 2022-04-19 PROCEDURE — 25000003 PHARM REV CODE 250

## 2022-04-19 PROCEDURE — 25000003 PHARM REV CODE 250: Performed by: ANESTHESIOLOGY

## 2022-04-19 PROCEDURE — 25000003 PHARM REV CODE 250: Performed by: STUDENT IN AN ORGANIZED HEALTH CARE EDUCATION/TRAINING PROGRAM

## 2022-04-19 PROCEDURE — 63600175 PHARM REV CODE 636 W HCPCS: Performed by: STUDENT IN AN ORGANIZED HEALTH CARE EDUCATION/TRAINING PROGRAM

## 2022-04-19 PROCEDURE — 72200005 HC VAGINAL DELIVERY LEVEL II

## 2022-04-19 PROCEDURE — 72100003 HC LABOR CARE, EA. ADDL. 8 HRS

## 2022-04-19 PROCEDURE — 11000001 HC ACUTE MED/SURG PRIVATE ROOM

## 2022-04-19 RX ORDER — SODIUM CHLORIDE 0.9 % (FLUSH) 0.9 %
10 SYRINGE (ML) INJECTION
Status: DISCONTINUED | OUTPATIENT
Start: 2022-04-19 | End: 2022-04-21 | Stop reason: HOSPADM

## 2022-04-19 RX ORDER — PROCHLORPERAZINE EDISYLATE 5 MG/ML
5 INJECTION INTRAMUSCULAR; INTRAVENOUS EVERY 6 HOURS PRN
Status: DISCONTINUED | OUTPATIENT
Start: 2022-04-19 | End: 2022-04-21 | Stop reason: HOSPADM

## 2022-04-19 RX ORDER — OXYTOCIN/RINGER'S LACTATE 30/500 ML
95 PLASTIC BAG, INJECTION (ML) INTRAVENOUS ONCE
Status: DISCONTINUED | OUTPATIENT
Start: 2022-04-19 | End: 2022-04-21 | Stop reason: HOSPADM

## 2022-04-19 RX ORDER — OXYTOCIN/RINGER'S LACTATE 30/500 ML
334 PLASTIC BAG, INJECTION (ML) INTRAVENOUS ONCE
Status: DISCONTINUED | OUTPATIENT
Start: 2022-04-19 | End: 2022-04-21 | Stop reason: HOSPADM

## 2022-04-19 RX ORDER — BUPIVACAINE HYDROCHLORIDE 2.5 MG/ML
INJECTION, SOLUTION EPIDURAL; INFILTRATION; INTRACAUDAL
Status: DISPENSED
Start: 2022-04-19 | End: 2022-04-19

## 2022-04-19 RX ORDER — ACETAMINOPHEN 325 MG/1
650 TABLET ORAL EVERY 6 HOURS PRN
Status: DISCONTINUED | OUTPATIENT
Start: 2022-04-19 | End: 2022-04-21 | Stop reason: HOSPADM

## 2022-04-19 RX ORDER — FENTANYL/BUPIVACAINE/NS/PF 2MCG/ML-.1
PLASTIC BAG, INJECTION (ML) INJECTION CONTINUOUS
Status: DISCONTINUED | OUTPATIENT
Start: 2022-04-19 | End: 2022-04-19

## 2022-04-19 RX ORDER — DOCUSATE SODIUM 100 MG/1
200 CAPSULE, LIQUID FILLED ORAL 2 TIMES DAILY PRN
Status: DISCONTINUED | OUTPATIENT
Start: 2022-04-19 | End: 2022-04-20

## 2022-04-19 RX ORDER — SODIUM CITRATE AND CITRIC ACID MONOHYDRATE 334; 500 MG/5ML; MG/5ML
30 SOLUTION ORAL ONCE
Status: DISCONTINUED | OUTPATIENT
Start: 2022-04-19 | End: 2022-04-19

## 2022-04-19 RX ORDER — METHYLERGONOVINE MALEATE 0.2 MG/ML
200 INJECTION INTRAVENOUS
Status: DISCONTINUED | OUTPATIENT
Start: 2022-04-19 | End: 2022-04-19

## 2022-04-19 RX ORDER — BUPIVACAINE HYDROCHLORIDE 2.5 MG/ML
INJECTION, SOLUTION EPIDURAL; INFILTRATION; INTRACAUDAL
Status: DISCONTINUED | OUTPATIENT
Start: 2022-04-19 | End: 2022-04-19

## 2022-04-19 RX ORDER — BUPIVACAINE HYDROCHLORIDE 2.5 MG/ML
INJECTION, SOLUTION EPIDURAL; INFILTRATION; INTRACAUDAL
Status: DISPENSED
Start: 2022-04-19 | End: 2022-04-20

## 2022-04-19 RX ORDER — DIPHENHYDRAMINE HCL 25 MG
25 CAPSULE ORAL EVERY 4 HOURS PRN
Status: DISCONTINUED | OUTPATIENT
Start: 2022-04-19 | End: 2022-04-21 | Stop reason: HOSPADM

## 2022-04-19 RX ORDER — MISOPROSTOL 200 UG/1
800 TABLET ORAL
Status: DISCONTINUED | OUTPATIENT
Start: 2022-04-19 | End: 2022-04-19

## 2022-04-19 RX ORDER — ONDANSETRON 8 MG/1
8 TABLET, ORALLY DISINTEGRATING ORAL EVERY 8 HOURS PRN
Status: DISCONTINUED | OUTPATIENT
Start: 2022-04-19 | End: 2022-04-21 | Stop reason: HOSPADM

## 2022-04-19 RX ORDER — DIPHENOXYLATE HYDROCHLORIDE AND ATROPINE SULFATE 2.5; .025 MG/1; MG/1
1 TABLET ORAL 4 TIMES DAILY PRN
Status: DISCONTINUED | OUTPATIENT
Start: 2022-04-19 | End: 2022-04-19

## 2022-04-19 RX ORDER — IBUPROFEN 600 MG/1
600 TABLET ORAL EVERY 6 HOURS
Status: DISCONTINUED | OUTPATIENT
Start: 2022-04-19 | End: 2022-04-21 | Stop reason: HOSPADM

## 2022-04-19 RX ORDER — CARBOPROST TROMETHAMINE 250 UG/ML
250 INJECTION, SOLUTION INTRAMUSCULAR
Status: DISCONTINUED | OUTPATIENT
Start: 2022-04-19 | End: 2022-04-19

## 2022-04-19 RX ORDER — BUPIVACAINE HYDROCHLORIDE 2.5 MG/ML
INJECTION, SOLUTION EPIDURAL; INFILTRATION; INTRACAUDAL
Status: COMPLETED
Start: 2022-04-19 | End: 2022-04-19

## 2022-04-19 RX ORDER — HYDROCODONE BITARTRATE AND ACETAMINOPHEN 5; 325 MG/1; MG/1
1 TABLET ORAL EVERY 4 HOURS PRN
Status: DISCONTINUED | OUTPATIENT
Start: 2022-04-19 | End: 2022-04-21 | Stop reason: HOSPADM

## 2022-04-19 RX ORDER — FENTANYL CITRATE 50 UG/ML
INJECTION, SOLUTION INTRAMUSCULAR; INTRAVENOUS
Status: COMPLETED
Start: 2022-04-19 | End: 2022-04-19

## 2022-04-19 RX ORDER — DIPHENHYDRAMINE HYDROCHLORIDE 50 MG/ML
25 INJECTION INTRAMUSCULAR; INTRAVENOUS EVERY 4 HOURS PRN
Status: DISCONTINUED | OUTPATIENT
Start: 2022-04-19 | End: 2022-04-21 | Stop reason: HOSPADM

## 2022-04-19 RX ORDER — HYDROCORTISONE 25 MG/G
CREAM TOPICAL 3 TIMES DAILY PRN
Status: DISCONTINUED | OUTPATIENT
Start: 2022-04-19 | End: 2022-04-21 | Stop reason: HOSPADM

## 2022-04-19 RX ORDER — ACETAMINOPHEN 500 MG
1000 TABLET ORAL ONCE
Status: COMPLETED | OUTPATIENT
Start: 2022-04-19 | End: 2022-04-19

## 2022-04-19 RX ORDER — FAMOTIDINE 10 MG/ML
20 INJECTION INTRAVENOUS ONCE
Status: DISCONTINUED | OUTPATIENT
Start: 2022-04-19 | End: 2022-04-19

## 2022-04-19 RX ORDER — HYDROCODONE BITARTRATE AND ACETAMINOPHEN 10; 325 MG/1; MG/1
1 TABLET ORAL EVERY 4 HOURS PRN
Status: DISCONTINUED | OUTPATIENT
Start: 2022-04-19 | End: 2022-04-21 | Stop reason: HOSPADM

## 2022-04-19 RX ADMIN — ACETAMINOPHEN 1000 MG: 500 TABLET, FILM COATED ORAL at 02:04

## 2022-04-19 RX ADMIN — IBUPROFEN 600 MG: 600 TABLET ORAL at 05:04

## 2022-04-19 RX ADMIN — DEXTROSE 3 MILLION UNITS: 50 INJECTION, SOLUTION INTRAVENOUS at 02:04

## 2022-04-19 RX ADMIN — IBUPROFEN 600 MG: 600 TABLET ORAL at 10:04

## 2022-04-19 RX ADMIN — Medication 250 ML: at 10:04

## 2022-04-19 RX ADMIN — DEXTROSE 3 MILLION UNITS: 50 INJECTION, SOLUTION INTRAVENOUS at 10:04

## 2022-04-19 RX ADMIN — PROCHLORPERAZINE EDISYLATE 5 MG: 5 INJECTION INTRAMUSCULAR; INTRAVENOUS at 11:04

## 2022-04-19 RX ADMIN — BUPIVACAINE HYDROCHLORIDE 8 ML: 2.5 INJECTION, SOLUTION EPIDURAL; INFILTRATION; INTRACAUDAL; PERINEURAL at 12:04

## 2022-04-19 RX ADMIN — BUPIVACAINE HYDROCHLORIDE 6 MG: 2.5 INJECTION, SOLUTION EPIDURAL; INFILTRATION; INTRACAUDAL; PERINEURAL at 01:04

## 2022-04-19 RX ADMIN — FENTANYL CITRATE 100 MCG: 50 INJECTION, SOLUTION INTRAMUSCULAR; INTRAVENOUS at 01:04

## 2022-04-19 RX ADMIN — DEXTROSE 3 MILLION UNITS: 50 INJECTION, SOLUTION INTRAVENOUS at 06:04

## 2022-04-19 NOTE — ANESTHESIA PROCEDURE NOTES
Epidural    Patient location during procedure: OB   Reason for block: primary anesthetic   Reason for block: labor analgesia requested by patient and obstetrician  Diagnosis: iup   Start time: 4/18/2022 9:39 PM  Timeout: 4/18/2022 9:37 PM  End time: 4/18/2022 9:44 PM  Surgery related to: Vaginal Delivery    Staffing  Performing Provider: Shayy Diaz MD  Authorizing Provider: Shayy Diaz MD        Preanesthetic Checklist  Completed: patient identified, IV checked, risks and benefits discussed, surgical consent, monitors and equipment checked, pre-op evaluation, timeout performed, anesthesia consent given, hand hygiene performed and patient being monitored  Preparation  Patient position: sitting  Prep: ChloraPrep  Patient monitoring: Pulse Ox  Reason for block: primary anesthetic   Epidural  Skin Anesthetic: lidocaine 1%  Skin Wheal: 3 mL  Administration type: continuous  Approach: midline  Interspace: L3-4    Injection technique: MARY saline  Needle and Epidural Catheter  Needle type: Tuohy   Needle gauge: 17  Needle length: 3.5 inches  Needle insertion depth: 6 cm  Catheter type: springwound and multi-orifice  Catheter size: 19 G  Catheter at skin depth: 10 cm  Insertion Attempts: 1  Test dose: 3 mL of lidocaine 1.5% with Epi 1-to-200,000  Additional Documentation: incremental injection, negative aspiration for heme and CSF, no paresthesia on injection, no signs/symptoms of IV or SA injection, no significant pain on injection and no significant complaints from patient  Needle localization: anatomical landmarks  Medications:  Volume per aspiration: 5 mL  Time between aspirations: 5 minutes   Assessment  Ease of block: easy  Patient's tolerance of the procedure: comfortable throughout block and no complaints No inadvertent dural puncture with Tuohy.  Dural puncture performed with spinal needle.

## 2022-04-19 NOTE — H&P
HISTORY AND PHYSICAL                                                OBSTETRICS          Subjective:       Jeanine Gonzalez is a 26 y.o.  female with IUP at 37w0d weeks gestation who presents as transfer from [place] for multiple fetal anomalies and pre-eclampsia w/o SF.    Patient denies contractions, denies vaginal bleeding, denies LOF.   Fetal Movement: normal.    This IUP is complicated by fetus with likely T21 (positive cfDNA)/duodenal atresia/polyhydramnios/pericardial effusion, GBS positive, and pre-eclampsia w/o SF.      Review of Systems   Constitutional: Negative for chills and fever.   Respiratory: Negative for cough and shortness of breath.    Cardiovascular: Negative for chest pain and palpitations.   Gastrointestinal: Positive for abdominal pain (ctx). Negative for constipation, diarrhea, nausea and vomiting.   Genitourinary: Negative for dysuria, urgency, vaginal bleeding, vaginal discharge and vaginal pain.   Musculoskeletal: Negative for arthralgias.   Integumentary:  Negative for rash.   Neurological: Negative for syncope and headaches.       PMHx:   Past Medical History:   Diagnosis Date    Elevated testosterone level in female     Hypothyroidism     Polycystic ovaries     Suspected fetal anomaly, antepartum, not applicable or unspecified fetus 2021    Suspected perimembranous VSD on targeted midtrimester anatomy scan    Thyroid disease     no meds at current time       PSHx:   Past Surgical History:   Procedure Laterality Date    CHOLECYSTECTOMY      ROBOT-ASSISTED EXPLORATORY LAPAROSCOPY      checking for endometriosis    TONSILLECTOMY      with adenoidectomy       All: Review of patient's allergies indicates:  No Known Allergies    Meds:   Medications Prior to Admission   Medication Sig Dispense Refill Last Dose    busPIRone (BUSPAR) 15 MG tablet Take 7.5 mg by mouth 2 (two) times daily as needed.   2022 at Unknown time    ondansetron (ZOFRAN) 8 MG tablet  "Take 8 mg by mouth every 8 (eight) hours.   2022 at Unknown time       SH:   Social History     Socioeconomic History    Marital status: Single   Tobacco Use    Smoking status: Former Smoker     Packs/day: 0.25     Types: Cigarettes    Smokeless tobacco: Never Used   Substance and Sexual Activity    Alcohol use: Not Currently     Comment: rarely    Drug use: No    Sexual activity: Yes     Partners: Male       FH:   Family History   Problem Relation Age of Onset    Thyroid disease Mother     Hypothyroidism Mother     Osteoarthritis Mother     Thyroid disease Sister     Hypothyroidism Sister     Rheum arthritis Sister     Bipolar disorder Sister     ADD / ADHD Sister     Thyroid disease Brother     Hypothyroidism Brother     Rheum arthritis Brother     ADD / ADHD Brother     Hashimoto's thyroiditis Sister        OBHx:   OB History    Para Term  AB Living   2 1 1 0 0 1   SAB IAB Ectopic Multiple Live Births   0 0 0 0 1      # Outcome Date GA Lbr Luis/2nd Weight Sex Delivery Anes PTL Lv   2 Current            1 Term 17 40w6d 02:47 / 01:38 3.825 kg (8 lb 6.9 oz) F Vag-Spont EPI N JAI      Name: BRYSON SCOTT      Apgar1: 8  Apgar5: 9       Objective:       BP (!) 154/94   Pulse 102   Temp 98 °F (36.7 °C) (Oral)   Resp 18   Ht 5' 6" (1.676 m)   Wt 98.4 kg (217 lb)   SpO2 99%   Breastfeeding No   BMI 35.02 kg/m²     Vitals:    22 2100 22 2115 22 2130 22 2145   BP: 135/64 (!) 152/76  (!) 154/94   Pulse: 97 89 93 102   Resp:    18   Temp:    98 °F (36.7 °C)   TempSrc:    Oral   SpO2: 97% 98% 99%    Weight:       Height:           General: NAD, alert, oriented, cooperative  HEENT: NCAT, EOM grossly intact  Lungs: Normal WOB  Heart: regular rate  Abdomen: gravid, soft, nondistended, nontender, no rebound or guarding  Extremities: trace edema    FHT: 120 bpm, moderate BTBV, +accels, -decels; Cat 1 (reassuring)  [place]: q 2mins  Presentation: " cephalic by ultrasound    Cervix: 1.5/70/-3    Maternal pelvis proven to 8 lbs 7 oz    Recent Growth Scan: 2577g (19%) at 36w1d    Lab Review  Blood Type A POS  GBBS: positive  Rubella: Immune  RPR: NR  HIV: negative  HepB: negative       Assessment:       37w0d weeks gestation with IOL 2/2 fetus with new pericardial effusion and newly diagnosed pre-eclampsia w/o SF.    Active Hospital Problems    Diagnosis  POA    *Encounter for induction of labor [Z34.90]  Not Applicable      Resolved Hospital Problems   No resolved problems to display.          Plan:     1. IOL   Risks, benefits, alternatives and possible complications have been discussed in detail with the patient.   - Consents signed and to chart  - Admit to Labor and Delivery unit  - Epidural per Anesthesia  - Draw CBC, T&S  - Notify Staff  - Recheck in 4 hrs or PRN  - COVID neg  - Post-Partum Hemorrhage risk - low  - Postpartum lovenox: no  - [place] balloon placed, will start pitocin    2. Fetal anomalies  - + cfDNA for T21, declined amniocentesis  - Duodenal atresia noted  - Newly diagnosed pericardial effusion today on US  - Polyhydramnios with LYUDMILA of 37  - Reassuring BPP today (10/10)    3. Pre-eclampsia w/o SF  - Newly elevated BP today  - CBC wnl  - Cr 0.4  - AST/ALT wnl  - P:C 0.44    4. Obesity  - Pre-pregnancy BMI <40  - does not meet criteria for postpartum lovenox  - encourage ambulation    5. GBS positive  - PCN intrapartum    JAIR Lowery MD  OBGYN PGY-3     Patient seen and examined. 26 year old  now at 37w1d admitted overnight for IOL secondary to Preeclampsia without severe features and suspected Trisomy 21 with positive cell free DNA, duodenal atresia, polyhydramnios, and newly diagnosed pericardial effusion    Temp:  [97.5 °F (36.4 °C)-98.7 °F (37.1 °C)] 97.8 °F (36.6 °C)  Pulse:  [] 98  Resp:  [18-20] 20  SpO2:  [90 %-100 %] 100 %  BP: (113-154)/(58-94) 125/82      Bps normal to mild range  Neonatology aware  Continue  induction of labor. Possible AROM with next check    Radha Hunt MD  PGY 6  Maternal Fetal Medicine  [facility]

## 2022-04-19 NOTE — PROGRESS NOTES
LABOR PROGRESS NOTE    MD to bedside for cervical check. Complaints: None.  Temp:  [97.5 °F (36.4 °C)-98.7 °F (37.1 °C)] 98 °F (36.7 °C)  Pulse:  [] 102  Resp:  [18] 18  SpO2:  [95 %-100 %] 99 %  BP: (134-154)/(63-94) 154/94    FHT: Category 1    CTX: q 2-3 minutes     CVX at 10:29 PM   Dilation (cm): 3  Effacement (%): 60  Station: -3  Dose(units) Oxytocin: *4 dayana-units/min     ASSESSMENT   26 y.o.  at 37w0d, latent    TIMELINE  1730: /-3 pit and balloon  2230: 3/70/-3; balloon out    PLAN  1. Labor management  -Continue continuous maternal/fetal monitoring.   -Recheck 2 hours or PRN  -Pitocin per orders    JAIR Lowery MD  OBGYN PGY-3

## 2022-04-19 NOTE — PROGRESS NOTES
LABOR PROGRESS NOTE    MD to bedside for cervical check. Complaints: None.    Temp:  [97.5 °F (36.4 °C)-98.7 °F (37.1 °C)] 98 °F (36.7 °C)  Pulse:  [] 98  Resp:  [18] 18  SpO2:  [91 %-100 %] 98 %  BP: (121-154)/(59-94) 134/80    FHT: Category 1    CTX: q 2-3 minutes     CVX at 2:56 AM   Dilation (cm): 5  Effacement (%): 70  Station: -3  Dose(units) Oxytocin: *8 dayana-units/min     ASSESSMENT   26 y.o.  at 37w1d, latent; blottable at this time    TIMELINE  1730: /-3 pit and balloon  2230: 3/70/-3; balloon out  0300: /-3  0730: /-3    PLAN  1. Labor management  -Continue continuous maternal/fetal monitoring.   -Recheck 2-4 hours or PRN  -Pitocin per orders;  - AROM at next check if no longer -3    2. Epidural to be reassessed by anesthesia    Marybeth Duff MD  OBGYN PGY-3

## 2022-04-19 NOTE — PROGRESS NOTES
LABOR PROGRESS NOTE    MD to bedside for cervical check. Complaints: None.  Temp:  [97.5 °F (36.4 °C)-98.7 °F (37.1 °C)] 98 °F (36.7 °C)  Pulse:  [] 90  Resp:  [18] 18  SpO2:  [92 %-100 %] 92 %  BP: (127-154)/(59-94) 128/79    FHT: Category 1    CTX: q 2-3 minutes     CVX at 2:56 AM   Dilation (cm): 4  Effacement (%): 70  Station: -3  Dose(units) Oxytocin: *4 dayana-units/min     ASSESSMENT   26 y.o.  at 37w1d, latent; blottable at this time    TIMELINE  1730: /-3 pit and balloon  2230: 3/70/-3; balloon out  0300: -3    PLAN  1. Labor management  -Continue continuous maternal/fetal monitoring.   -Recheck 2-4 hours or PRN  -Pitocin per orders; increase discussed with RN  - AROM at next check if no longer -3    JAIR Lowery MD  OBGYN PGY-3

## 2022-04-19 NOTE — PLAN OF CARE
VSS. Pain controlled with scheduled oral pain medication. Pumping in baby for NICU. Perineal swelling noted, tucks and dermoplast provided. Fundus firm and midline with moderate lochia rubra. LE mildly impaired d/t s/p epidural. Ambulating with assist x1. Due to void at 2000. Unable to pass flatus. No concerns at this time.

## 2022-04-19 NOTE — L&D DELIVERY NOTE
[facility] - Labor & Delivery  Vaginal Delivery   Operative Note    SUMMARY     Normal spontaneous vaginal delivery of live infant, skin to skin was unable to be performed due to infant requiring evaluation by NICU.  Infant delivered position OA over intact perineum.  Nuchal cord: No.    Spontaneous delivery of placenta and IV pitocin given noting good uterine tone.  No lacerations noted.  Patient tolerated delivery well. Sponge needle and lap counted correctly x2.   cc    Sindy Huynh MD/MPH  OB/GYN PGY2    Indications:  (spontaneous vaginal delivery)  Pregnancy complicated by:   Patient Active Problem List   Diagnosis    Hashimoto's thyroiditis    PCOS (polycystic ovarian syndrome)    Overweight (BMI 25.0-29.9)    Positive result on maternal serum screen for trisomy 21    Suspected fetal anomaly, antepartum, not applicable or unspecified fetus    At increased risk of exposure to COVID-19 virus    Hypertension affecting pregnancy in third trimester     (spontaneous vaginal delivery)     Admitting GA: 37w1d    Delivery Information for Zoran Gonzalez    Birth information:  YOB: 2023   Time of birth: 2:02 PM   Sex: male   Head Delivery Date/Time: 2022  2:02 PM   Delivery type: Vaginal, Spontaneous   Gestational Age: 37w1d    Delivery Providers    Delivering clinician: Dianne Aaron MD   Provider Role    MD Maddi Polanco, RN     KEENA Arce, KEENA             Measurements    Weight:   Length:          Apgars    Living status: Living  Apgars:  1 min.:  5 min.:  10 min.:  15 min.:  20 min.:    Skin color:         Heart rate:         Reflex irritability:         Muscle tone:         Respiratory effort:         Total:  9 9      Apgars assigned by: NICU         Operative Delivery    Forceps attempted?: No  Vacuum extractor attempted?: No         Shoulder Dystocia    Shoulder dystocia present?: No           Presentation     Presentation: Vertex  Position: Occiput Anterior           Interventions/Resuscitation    Method: NICU Attended       Cord    Vessels: 3 vessels  Complications: None  Delayed Cord Clamping?: Yes  Cord Clamped Date/Time: 2022  2:03 PM  Cord Blood Disposition: Sent with Baby  Gases Sent?: No  Stem Cell Collection (by MD): No       Placenta    Placenta delivery date/time: 2022 1406  Placenta removal: Spontaneous  Placenta appearance: Abnormal  Placenta disposition: discarded           Labor Events:       labor: No     Labor Onset Date/Time: 2022       Dilation Complete Date/Time: 2022 11:38     Start Pushing Date/Time: 2022 14:01       Start Pushing Date/Time: 2022 14:01     Rupture Date/Time: 22  1138         Rupture type:          Fluid Amount:       Fluid Color: Clear      Fluid Odor:       Membrane Status: SRM (Spontaneous Rupture)               steroids: None     Antibiotics given for GBS: Yes     Induction: oxytocin     Indications for induction:  Fetal Abnormality     Augmentation: oxytocin     Indications for augmentation:       Labor complications: None     Additional complications:          Cervical ripening:                     Delivery:      Episiotomy: None     Indication for Episiotomy:       Perineal Lacerations: None Repaired:      Periurethral Laceration:   Repaired:     Labial Laceration:   Repaired:     Sulcus Laceration:   Repaired:     Vaginal Laceration:   Repaired:     Cervical Laceration:   Repaired:     Repair suture: None     Repair # of packets:       Last Value - EBL - Nursing (mL):       Sum - EBL - Nursing (mL): 0     Last Value - EBL - Anesthesia (mL):      Calculated QBL (mL): 100      Vaginal Sweep Performed: Yes     Surgicount Correct: Yes       Other providers:       Anesthesia    Method: Epidural          Details (if applicable):  Trial of Labor      Categorization:      Priority:     Indications for  :     Incision Type:       Additional  information:  Forceps:    Vacuum:    Breech:    Observed anomalies    Other (Comments):

## 2022-04-19 NOTE — PROGRESS NOTES
LABOR PROGRESS NOTE    Exam per Sindy Huynh MD PGY2    MD to bedside for cervical check. Complaints: None.    Temp:  [97.5 °F (36.4 °C)-98.7 °F (37.1 °C)] 97.8 °F (36.6 °C)  Pulse:  [] 98  Resp:  [18-20] 18  SpO2:  [90 %-100 %] 100 %  BP: (113-154)/(58-94) 125/82    FHT: Category 1    CTX: q 2-3 minutes     CVX at 11:30  Dilation (cm): 5  Effacement (%): 70  Station: -3  Dose(units) Oxytocin: *8 dayana-units/min     ASSESSMENT   26 y.o.  at 37w1d, latent; blottable at this time    TIMELINE  1730: /-3 pit and balloon  2230: 3/70/-3; balloon out  0300: /-3  0730: /-3  11:30: 5/-2, AROM clear, pit going    PLAN  1. Labor management  -Continue continuous maternal/fetal monitoring.   -Recheck 4 hours or PRN  -Pitocin per orders;    Marybeth Duff MD  OBGYN PGY-3

## 2022-04-19 NOTE — NURSING
Hand expressed .5ml of colostrum and sent to NICU for baby.   Mom educated on how to hand express.

## 2022-04-20 PROBLEM — O35.9XX0 SUSPECTED FETAL ANOMALY, ANTEPARTUM, NOT APPLICABLE OR UNSPECIFIED FETUS: Status: RESOLVED | Noted: 2021-12-16 | Resolved: 2022-04-20

## 2022-04-20 PROBLEM — Z91.89 AT INCREASED RISK OF EXPOSURE TO COVID-19 VIRUS: Status: RESOLVED | Noted: 2021-12-16 | Resolved: 2022-04-20

## 2022-04-20 PROBLEM — D62 ACUTE BLOOD LOSS ANEMIA: Status: ACTIVE | Noted: 2022-04-20

## 2022-04-20 LAB
BASOPHILS # BLD AUTO: 0.03 K/UL (ref 0–0.2)
BASOPHILS NFR BLD: 0.3 % (ref 0–1.9)
DIFFERENTIAL METHOD: ABNORMAL
EOSINOPHIL # BLD AUTO: 0 K/UL (ref 0–0.5)
EOSINOPHIL NFR BLD: 0.4 % (ref 0–8)
ERYTHROCYTE [DISTWIDTH] IN BLOOD BY AUTOMATED COUNT: 12.8 % (ref 11.5–14.5)
HCT VFR BLD AUTO: 28.9 % (ref 37–48.5)
HGB BLD-MCNC: 9.6 G/DL (ref 12–16)
HIV1+2 IGG SERPL QL IA.RAPID: NORMAL
IMM GRANULOCYTES # BLD AUTO: 0.06 K/UL (ref 0–0.04)
IMM GRANULOCYTES NFR BLD AUTO: 0.6 % (ref 0–0.5)
LYMPHOCYTES # BLD AUTO: 1.9 K/UL (ref 1–4.8)
LYMPHOCYTES NFR BLD: 18.5 % (ref 18–48)
MCH RBC QN AUTO: 29.9 PG (ref 27–31)
MCHC RBC AUTO-ENTMCNC: 33.2 G/DL (ref 32–36)
MCV RBC AUTO: 90 FL (ref 82–98)
MONOCYTES # BLD AUTO: 0.9 K/UL (ref 0.3–1)
MONOCYTES NFR BLD: 9 % (ref 4–15)
NEUTROPHILS # BLD AUTO: 7.2 K/UL (ref 1.8–7.7)
NEUTROPHILS NFR BLD: 71.2 % (ref 38–73)
NRBC BLD-RTO: 0 /100 WBC
PLATELET # BLD AUTO: 189 K/UL (ref 150–450)
PMV BLD AUTO: 10 FL (ref 9.2–12.9)
RBC # BLD AUTO: 3.21 M/UL (ref 4–5.4)
WBC # BLD AUTO: 10.15 K/UL (ref 3.9–12.7)

## 2022-04-20 PROCEDURE — 85025 COMPLETE CBC W/AUTO DIFF WBC: CPT | Performed by: OBSTETRICS & GYNECOLOGY

## 2022-04-20 PROCEDURE — 86703 HIV-1/HIV-2 1 RESULT ANTBDY: CPT | Performed by: STUDENT IN AN ORGANIZED HEALTH CARE EDUCATION/TRAINING PROGRAM

## 2022-04-20 PROCEDURE — 72100002 HC LABOR CARE, 1ST 8 HOURS

## 2022-04-20 PROCEDURE — 25000003 PHARM REV CODE 250: Performed by: STUDENT IN AN ORGANIZED HEALTH CARE EDUCATION/TRAINING PROGRAM

## 2022-04-20 PROCEDURE — 11000001 HC ACUTE MED/SURG PRIVATE ROOM

## 2022-04-20 PROCEDURE — 36415 COLL VENOUS BLD VENIPUNCTURE: CPT | Performed by: OBSTETRICS & GYNECOLOGY

## 2022-04-20 RX ORDER — IRON POLYSACCHARIDE COMPLEX 150 MG
150 CAPSULE ORAL DAILY
Qty: 60 CAPSULE | Refills: 0 | Status: SHIPPED | OUTPATIENT
Start: 2022-04-20

## 2022-04-20 RX ORDER — DOCUSATE SODIUM 100 MG/1
200 CAPSULE, LIQUID FILLED ORAL 2 TIMES DAILY
Qty: 60 CAPSULE | Refills: 0 | Status: SHIPPED | OUTPATIENT
Start: 2022-04-20

## 2022-04-20 RX ORDER — DOCUSATE SODIUM 100 MG/1
200 CAPSULE, LIQUID FILLED ORAL 2 TIMES DAILY
Status: DISCONTINUED | OUTPATIENT
Start: 2022-04-20 | End: 2022-04-21 | Stop reason: HOSPADM

## 2022-04-20 RX ORDER — IBUPROFEN 600 MG/1
600 TABLET ORAL EVERY 6 HOURS PRN
Qty: 60 TABLET | Refills: 0 | Status: SHIPPED | OUTPATIENT
Start: 2022-04-20

## 2022-04-20 RX ORDER — IRON POLYSACCHARIDE COMPLEX 150 MG
150 CAPSULE ORAL DAILY
Status: DISCONTINUED | OUTPATIENT
Start: 2022-04-20 | End: 2022-04-21 | Stop reason: HOSPADM

## 2022-04-20 RX ADMIN — POLYSACCHARIDE-IRON COMPLEX 150 MG: 150 CAPSULE ORAL at 05:04

## 2022-04-20 RX ADMIN — IBUPROFEN 600 MG: 600 TABLET ORAL at 05:04

## 2022-04-20 RX ADMIN — IBUPROFEN 600 MG: 600 TABLET ORAL at 11:04

## 2022-04-20 RX ADMIN — DOCUSATE SODIUM 200 MG: 100 CAPSULE, LIQUID FILLED ORAL at 08:04

## 2022-04-20 RX ADMIN — IBUPROFEN 600 MG: 600 TABLET ORAL at 04:04

## 2022-04-20 NOTE — PROGRESS NOTES
POSTPARTUM PROGRESS NOTE    Subjective:     PPD/POD#: 1   Procedure:    EGA: 37w1d   N/V: No   F/C: No   Abd Pain: Mild, well-controlled with oral pain medication   Lochia: Mild   Voiding: Yes   Ambulating: Yes   Bowel fnc: Yes   Breastfeeding: yes   Contraception: Per primary OB   Circumcision: N/A, baby in NICU     Objective:      Temp:  [97.5 °F (36.4 °C)-98.4 °F (36.9 °C)] 97.5 °F (36.4 °C)  Pulse:  [] 84  Resp:  [12-20] 16  SpO2:  [94 %-100 %] 97 %  BP: (107-145)/(54-87) 124/59    Lung: Normal respiratory effort   Abdomen: Soft, appropriately tender   Uterus: Firm, no fundal tenderness   Incision: N/A   : Deferred   Extremities: Bilateral trace edema     Lab Review    Recent Labs   Lab 22  1206   *   K 3.8      CO2 21*   BUN 3*   CREATININE 0.40*   *   PROT 5.8*   BILITOT 0.4   ALKPHOS 101   ALT 14   AST 21       Recent Labs   Lab 22  1206 22  1756 22  0539   WBC 6.50 7.88 10.15   HGB 10.2* 12.2 9.6*   HCT 29.7* 36.3* 28.9*   MCV 86 88 90    247 189         I/O    Intake/Output Summary (Last 24 hours) at 2022 1316  Last data filed at 2022 0400  Gross per 24 hour   Intake 721.3 ml   Output 2300 ml   Net -1578.7 ml        Assessment and Plan:   Postpartum care:  - Patient doing well.  - Continue routine management and advances.    PreE withOUT SF  - BP: (107-145)/(54-87) 124/59  -PC 0.44, Cr 0.4, AST/ALT , Plt 228  -asymptomatic  -does not meet criteria for magnesium sulfate  -does not meet criteria for oral blood pressure medications    Anemia of acute blood loss  - H/H as above  - QBL: 100  - asymptomatic  - iron/colace        Sindy Huynh MD/MPH  OB/GYN PGY2

## 2022-04-20 NOTE — MEDICAL/APP STUDENT
"POSTPARTUM PROGRESS NOTE    Subjective:     PPD/POD#: 1   Procedure:    EGA: 37w1d   N/V: No   F/C: No   Abd Pain: Mild, well-controlled with oral pain medication   Lochia: Moderate, soaking <1 pad/hr   Voiding: Yes   Ambulating: Yes   Bowel fnc: Yes   Breastfeeding: Pumping breastmilk but has not fed baby yet    Contraception: Per primary OB   Circumcision: Consented.  Needs to be examined by OB attending.     Denies any headache, nausea, RUQ pain   Mentioned having some pain around epigastric area that feelst like "skin on the inside is too stretched out" and says she had this pain throughout pregnancy as well   Objective:      Temp:  [97 °F (36.1 °C)-98.4 °F (36.9 °C)] 97.7 °F (36.5 °C)  Pulse:  [] 81  Resp:  [12-20] 17  SpO2:  [90 %-100 %] 98 %  BP: (107-167)/(54-95) 118/66    Lung: Normal respiratory effort   Abdomen: Soft, appropriately tender   Uterus: Firm, no fundal tenderness   Incision: N/A   : Deferred   Extremities: Bilateral trace edema     Lab Review    Recent Labs   Lab 22  1206   *   K 3.8      CO2 21*   BUN 3*   CREATININE 0.40*   *   PROT 5.8*   BILITOT 0.4   ALKPHOS 101   ALT 14   AST 21       Recent Labs   Lab 22  1206 22  1756   WBC 6.50 7.88   HGB 10.2* 12.2   HCT 29.7* 36.3*   MCV 86 88    247         I/O    Intake/Output Summary (Last 24 hours) at 2022 0629  Last data filed at 2022 0400  Gross per 24 hour   Intake 3503.66 ml   Output 2300 ml   Net 1203.66 ml        Assessment and Plan:   Postpartum care:  - Patient doing well.  - Continue routine management and advances.          Ugo Maciel"

## 2022-04-20 NOTE — ANESTHESIA POSTPROCEDURE EVALUATION
Anesthesia Post Evaluation    Patient: Jeanine Gonzalez    Procedure(s) Performed: * No procedures listed *    Final Anesthesia Type: epidural      Patient location during evaluation: med/surg floor  Patient participation: Yes- Able to Participate  Level of consciousness: awake and alert and oriented  Post-procedure vital signs: reviewed and stable  Pain management: adequate  Airway patency: patent    PONV status at discharge: No PONV  Anesthetic complications: no      Cardiovascular status: hemodynamically stable  Respiratory status: unassisted, spontaneous ventilation and room air  Hydration status: euvolemic  Follow-up not needed.          Vitals Value Taken Time   /67 04/20/22 1651   Temp 36.7 °C (98.1 °F) 04/20/22 1651   Pulse 85 04/20/22 1651   Resp 18 04/20/22 1651   SpO2 98 % 04/20/22 1651         No case tracking events are documented in the log.      Pain/Lili Score: Pain Rating Prior to Med Admin: 1 (4/20/2022  5:18 PM)

## 2022-04-20 NOTE — NURSING
Patient in no apparent distress. VSS. Ambulating and voiding without difficulty. Pumping for baby in NICU. Pain adequately controlled with scheduled pain medications. No acute events this shift. No additional needs at this time.

## 2022-04-21 VITALS
HEIGHT: 66 IN | SYSTOLIC BLOOD PRESSURE: 136 MMHG | BODY MASS INDEX: 34.87 KG/M2 | RESPIRATION RATE: 18 BRPM | WEIGHT: 217 LBS | HEART RATE: 92 BPM | DIASTOLIC BLOOD PRESSURE: 63 MMHG | TEMPERATURE: 98 F | OXYGEN SATURATION: 98 %

## 2022-04-21 PROBLEM — O14.90 PREECLAMPSIA: Status: ACTIVE | Noted: 2022-04-21

## 2022-04-21 PROCEDURE — 25000003 PHARM REV CODE 250: Performed by: STUDENT IN AN ORGANIZED HEALTH CARE EDUCATION/TRAINING PROGRAM

## 2022-04-21 RX ADMIN — IBUPROFEN 600 MG: 600 TABLET ORAL at 03:04

## 2022-04-21 RX ADMIN — DOCUSATE SODIUM 200 MG: 100 CAPSULE, LIQUID FILLED ORAL at 08:04

## 2022-04-21 RX ADMIN — IBUPROFEN 600 MG: 600 TABLET ORAL at 05:04

## 2022-04-21 RX ADMIN — POLYSACCHARIDE-IRON COMPLEX 150 MG: 150 CAPSULE ORAL at 08:04

## 2022-04-21 NOTE — PLAN OF CARE
Copied from baby's NICU chart:    SOCIAL WORK DISCHARGE PLANNING ASSESSMENT    Sw completed discharge planning assessment with pt's parents in mother's room 600.  Pt's parents were easily engaged. Education on the role of  was provided. Emotional support provided throughout assessment.      Legal Name: iGl Ruelas :  2023  Address: [facility] [place] [address] [address]  Parent's Phone Numbers: [phone]    Pediatrician: [facility]. Yan [facility]    Education: Information given on NICU Education Classes; Physician/NNP daily rounds; and Postpartum Depression signs.   Potential Eligibility for SSI Benefits: Yes. Sw to provide diagnosis letter for application process.      Patient Active Problem List   Diagnosis    Duodenal atresia    Single liveborn infant    Pericardial effusion in          Birth Hospital:[facility]   HEAVENLY: 2023    Birth Weight: 2.66 kg (5 lb 13.8 oz)  Birth Length: 51cm  Gestational Age: 37w1d             22 1359   NICU Assessment   Assessment Type Discharge Planning Assessment   Source of Information family   Verified Demographic and Insurance Information Yes   Insurance   (Parents undecided since mom is waiting to see if she gets approved for Medicaid as secondary. FOB has commercial insurance if they need to add baby. They are aware to add within 30 days.)   Relationship Status of Parents In relationship  (approx 8 yrs)   Other children (include names and ages) Keith, 5 y/o sister   Mother's Employer [employer]   Father's Involvement Fully Involved   Is Father signing the birth certificate Yes   Father Name and  Ramírez Ruelas (1993) [phone]   Father's Address same as moms   Father's Employer [employer]   Other Contacts Names and Numbers Karen Espinosa (mg) [phone] and Sue Jessenia (pg) [phone]   Infant Feeding Plan expressed breast milk   Does baby have crib or safe sleep space? Yes   Do you have a car seat? Yes    Resource/Environmental Concerns none   Potential Discharge Needs Early Intervention Program   Resources/Education Provided Post Partum Depression;WIC;Early Intervention Program;SSI Benefits;[employer]y [place] Insurance plan;My Preemi Camila;My NICU Baby Camila   Discharge Plan A Home with family

## 2022-04-21 NOTE — MEDICAL/APP STUDENT
POSTPARTUM PROGRESS NOTE    Subjective:     PPD/POD#: 2   Procedure:    EGA: 37w1d   N/V: No   F/C: No   Abd Pain: Mild, well-controlled with oral pain medication   Lochia: Mild- Had 1 clot yesterday but this was examined by nurse     Voiding: Yes   Ambulating: Yes   Bowel fnc: No   Breastfeeding: Yes- pumping breastmilk, baby cannot be fed yet b/c of duodenal atresia   Contraception: Per primary OB- wants to go on pill    Circumcision: Mother wants circumcision, to be decided during duo atresia repair      Objective:      Temp:  [97.5 °F (36.4 °C)-98.7 °F (37.1 °C)] 97.8 °F (36.6 °C)  Pulse:  [72-91] 72  Resp:  [16-18] 16  SpO2:  [96 %-100 %] 96 %  BP: (122-137)/(59-77) 122/68    Lung: Normal respiratory effort   Abdomen: Soft, appropriately tender   Uterus: Firm, no fundal tenderness   Incision: N/A   : Deferred   Extremities: Bilateral trace edema     Lab Review    Recent Labs   Lab 22  1206   *   K 3.8      CO2 21*   BUN 3*   CREATININE 0.40*   *   PROT 5.8*   BILITOT 0.4   ALKPHOS 101   ALT 14   AST 21       Recent Labs   Lab 22  1206 22  1756 22  0539   WBC 6.50 7.88 10.15   HGB 10.2* 12.2 9.6*   HCT 29.7* 36.3* 28.9*   MCV 86 88 90    247 189         I/O  No intake or output data in the 24 hours ending 22 0613     Assessment and Plan:   Postpartum care:  - Patient doing well.  - Continue routine management and advances.  Pre-E:    -no n/v, headache, asymptomatic  -BP readings have been normal, does not meet criteria for oral BP meds   Anemia:   -asymptomatic   -iron/colace given        Ugo Maciel

## 2022-04-21 NOTE — PLAN OF CARE
Pt. Ambulating and voiding without difficulty. Pain well controlled with po pain medication. Pt. Denies any needs at this time. V/S WNL.

## 2022-04-21 NOTE — LACTATION NOTE
Lactation Rounds:  Pt is ready for lactation discharge instructions, reviewed pumping and milk handling.  Reviewed engorgement and management at home.  Provided [employer] milk truck number. She has pump to use at home.  No concerns at the moment. Lactation warm line number provided.

## 2022-04-21 NOTE — DISCHARGE SUMMARY
Delivery Discharge Summary  Obstetrics      Primary OB Clinician: Hilton Tarvis MD      Admission date: 2022  Discharge date: 2022    Disposition: To home, self care    Discharge Diagnosis List:      Patient Active Problem List   Diagnosis    Hashimoto's thyroiditis    PCOS (polycystic ovarian syndrome)    Overweight (BMI 25.0-29.9)    Positive result on maternal serum screen for trisomy 21    Hypertension affecting pregnancy in third trimester     (spontaneous vaginal delivery)    Acute blood loss anemia    Preeclampsia withOUT severe features       Procedure:     Hospital Course:  Jeanine Gonzalez is a 26 y.o. now , PPD #2 who was admitted on 2022 at 37w1d for IOL 2/2 preE w/o SF and multiple fetal anomalies. Patient was subsequently admitted to labor and delivery unit with signed consents.     Labor course was uncomplicated and resulted in  without complications. Please see delivery note for further details.     Her postpartum course was uncomplicated. BP remained well controlled and did not require the initiation of antihypertensives.     On discharge day, patient's pain is controlled with oral pain medications. Pt is tolerating ambulation without SOB or CP, and regular diet without N/V. Reports lochia is mild. Denies any HA, vision changes, F/C, LE swelling. Denies any breast pain/soreness.    Pt in stable condition and ready for discharge. She has been instructed to start and/or continue medications and follow up with her obstetrics provider as listed below.    Pertinent studies:  CBC  Recent Labs   Lab 22  1206 22  1756 22  0539   WBC 6.50 7.88 10.15   HGB 10.2* 12.2 9.6*   HCT 29.7* 36.3* 28.9*   MCV 86 88 90    247 189          Immunization History   Administered Date(s) Administered    Influenza 2019        Delivery:    Episiotomy: None   Lacerations: None   Repair suture: None   Repair # of packets:     Blood loss (ml):        Birth information:  YOB: 2023   Time of birth: 2:02 PM   Sex: male   Delivery type: Vaginal, Spontaneous   Gestational Age: 37w1d    Delivery Clinician:      Other providers:       Additional  information:  Forceps:    Vacuum:    Breech:    Observed anomalies      Living?:           APGARS  One minute Five minutes Ten minutes   Skin color:         Heart rate:         Grimace:         Muscle tone:         Breathing:         Totals: 9  9        Placenta: Delivered:       appearance      Patient Instructions:   Current Discharge Medication List      START taking these medications    Details   docusate sodium (COLACE) 100 MG capsule Take 2 capsules (200 mg total) by mouth 2 (two) times daily.  Qty: 60 capsule, Refills: 0      ibuprofen (ADVIL,MOTRIN) 600 MG tablet Take 1 tablet (600 mg total) by mouth every 6 (six) hours as needed for Pain.  Qty: 60 tablet, Refills: 0      iron polysaccharides (NIFEREX) 150 mg iron Cap Take 1 capsule (150 mg total) by mouth once daily.  Qty: 60 capsule, Refills: 0         CONTINUE these medications which have NOT CHANGED    Details   busPIRone (BUSPAR) 15 MG tablet Take 7.5 mg by mouth 2 (two) times daily as needed.         STOP taking these medications       ondansetron (ZOFRAN) 8 MG tablet Comments:   Reason for Stopping:               Discharge Procedure Orders   Diet Adult Regular     Pelvic Rest   Order Comments: Pelvic rest until follow up visit. Nothing in vagina -no sex, tampons, douching, etc.     Notify your health care provider if you experience any of the following:   Order Comments: Heavy vaginal bleeding saturating more than 1 pad per hr for at least consecutive 2 hrs.     Notify your health care provider if you experience any of the following:  increased confusion or weakness     Notify your health care provider if you experience any of the following:  persistent dizziness, light-headedness, or visual disturbances     Notify your health care provider if  you experience any of the following:  severe persistent headache     Notify your health care provider if you experience any of the following:  difficulty breathing or increased cough     Notify your health care provider if you experience any of the following:  severe uncontrolled pain     Notify your health care provider if you experience any of the following:  persistent nausea and vomiting or diarrhea     Notify your health care provider if you experience any of the following:  temperature >100.4        Follow-up Information     Primary OBGYN Follow up in 1 week(s).    Why: 1 week BP check  6 week Postpartum visit                        Marybeth Duff MD  OBGYN PGY-3

## 2022-04-26 ENCOUNTER — POSTPARTUM VISIT (OUTPATIENT)
Dept: OBSTETRICS AND GYNECOLOGY | Facility: CLINIC | Age: 27
End: 2022-04-26
Payer: COMMERCIAL

## 2022-04-26 VITALS
SYSTOLIC BLOOD PRESSURE: 130 MMHG | BODY MASS INDEX: 31.53 KG/M2 | DIASTOLIC BLOOD PRESSURE: 92 MMHG | HEIGHT: 66 IN | WEIGHT: 196.19 LBS

## 2022-04-26 PROCEDURE — 99999 PR PBB SHADOW E&M-EST. PATIENT-LVL III: ICD-10-PCS | Mod: PBBFAC,,,

## 2022-04-26 PROCEDURE — 59430 PR CARE AFTER DELIVERY ONLY: ICD-10-PCS | Mod: S$GLB,,, | Performed by: OBSTETRICS & GYNECOLOGY

## 2022-04-26 PROCEDURE — 99999 PR PBB SHADOW E&M-EST. PATIENT-LVL III: CPT | Mod: PBBFAC,,,

## 2022-04-26 NOTE — PROGRESS NOTES
"Chief Complaint: Blood Pressure Check      HPI:  26 y.o. here for BP check. Pt is 1 week s/p  in pregnancy complicated by preeclampsia w/o severe features, and fetal anomalies( Trisomy 21, pericardial effusion), infant in NICU. Pt did not require magnesium or hypertensive medication.   Pt denies complaints of /Vision changes/abdominal cramping/swelling/urination problems. She reports occasional headaches but also with tight, sore muscles in neck.  She is not taking any BP medications at this time.    Infant in NICU and Mom pumping- reports infant doing well.      ROS   Systemic: Not feeling tired (fatigue).  No fever chills   Gastrointestinal: No nausea, vomiting, no abdominal pain.  No diarrhea.  Genitourinary: No dysuria. No Pelvic Pain  Skin: No rash.    BP (!) 130/92   Ht 5' 6" (1.676 m)   Wt 89 kg (196 lb 3.4 oz)   Breastfeeding Yes   BMI 31.67 kg/m²        PE:  Vitals: Normal and reviewed       Plan:  1. PP with Pre-E BP check--manual BP stable. Reviewed s/s pre e  and/or readings to report back to the office. Advised try Icy Hot on neck and shoulders and OK to use Tylenol. It headache persists to go to [facility]. Pt reports her friend is an RN and she can check her BP with her also.      RTC for pp visit      "